# Patient Record
Sex: MALE | Race: WHITE
[De-identification: names, ages, dates, MRNs, and addresses within clinical notes are randomized per-mention and may not be internally consistent; named-entity substitution may affect disease eponyms.]

---

## 2023-03-28 ENCOUNTER — NON-APPOINTMENT (OUTPATIENT)
Age: 48
End: 2023-03-28

## 2023-05-24 PROBLEM — Z00.00 ENCOUNTER FOR PREVENTIVE HEALTH EXAMINATION: Status: ACTIVE | Noted: 2023-05-24

## 2023-05-25 ENCOUNTER — NON-APPOINTMENT (OUTPATIENT)
Age: 48
End: 2023-05-25

## 2023-05-25 ENCOUNTER — APPOINTMENT (OUTPATIENT)
Dept: OTOLARYNGOLOGY | Facility: CLINIC | Age: 48
End: 2023-05-25
Payer: COMMERCIAL

## 2023-05-25 VITALS
BODY MASS INDEX: 33.49 KG/M2 | DIASTOLIC BLOOD PRESSURE: 82 MMHG | HEART RATE: 71 BPM | SYSTOLIC BLOOD PRESSURE: 125 MMHG | WEIGHT: 275 LBS | HEIGHT: 76 IN

## 2023-05-25 DIAGNOSIS — H61.21 IMPACTED CERUMEN, RIGHT EAR: ICD-10-CM

## 2023-05-25 DIAGNOSIS — Z87.19 PERSONAL HISTORY OF OTHER DISEASES OF THE DIGESTIVE SYSTEM: ICD-10-CM

## 2023-05-25 DIAGNOSIS — Z80.8 FAMILY HISTORY OF MALIGNANT NEOPLASM OF OTHER ORGANS OR SYSTEMS: ICD-10-CM

## 2023-05-25 DIAGNOSIS — H69.83 OTHER SPECIFIED DISORDERS OF EUSTACHIAN TUBE, BILATERAL: ICD-10-CM

## 2023-05-25 PROCEDURE — 99203 OFFICE O/P NEW LOW 30 MIN: CPT | Mod: 25

## 2023-05-25 PROCEDURE — 69210 REMOVE IMPACTED EAR WAX UNI: CPT

## 2023-05-25 NOTE — PHYSICAL EXAM
[Midline] : trachea located in midline position [Normal] : no rashes [de-identified] : Right cerumen impaction. Left EAC wnl. [] : septum deviated to the left [Removed] : palatine tonsils previously removed

## 2023-05-25 NOTE — ASSESSMENT
[FreeTextEntry1] : Brown Roche presents for evaluation. HE notes snoring and possible sleep apnea. Will obtain home sleep study. Right cerumen impaction was removed today. he has history of bilateral eustachian tube dysfunction with one ear infection per year. He notes chronic rhinitis symptoms and has septal deviation on exam.\par \par - Obtain home sleep study.\par - Start flonase 2 sprays to each nostril BID.\par - Follow up after sleep study.

## 2023-05-25 NOTE — HISTORY OF PRESENT ILLNESS
[de-identified] : Brown Roche is a 48 yo male who presents for evaluation of snoring. He was seen by an optometrist and told he may have sleep apnea. His wife notes that he snores frequently. He is unsure of pauses in breathing at night. He notes some daytime sleepiness but drinks two large coffees a day. He denies fevers or chills. He denies recurrent throat infections. He notes chronic issues with nasal congestion. He denies recurrent sinus infections. She denies rhinorrhea, postnasal drainage, or sinus pressure. He notes remote history of allergy testing, positive for pollen. He does not use nasal sprays. He gets one ear infection per year and was told he has eustachian tube dysfunction. He denies otalgia, otorrhea, or hearing loss. HE denies tinnitus or vertigo.

## 2023-07-12 ENCOUNTER — OUTPATIENT (OUTPATIENT)
Dept: OUTPATIENT SERVICES | Facility: HOSPITAL | Age: 48
LOS: 1 days | End: 2023-07-12
Payer: COMMERCIAL

## 2023-07-12 DIAGNOSIS — G47.33 OBSTRUCTIVE SLEEP APNEA (ADULT) (PEDIATRIC): ICD-10-CM

## 2023-07-12 PROCEDURE — 95800 SLP STDY UNATTENDED: CPT

## 2023-07-12 PROCEDURE — 95800 SLP STDY UNATTENDED: CPT | Mod: 26

## 2023-07-26 ENCOUNTER — APPOINTMENT (OUTPATIENT)
Dept: OTOLARYNGOLOGY | Facility: CLINIC | Age: 48
End: 2023-07-26
Payer: COMMERCIAL

## 2023-07-26 VITALS — HEART RATE: 88 BPM

## 2023-07-26 VITALS
BODY MASS INDEX: 32.88 KG/M2 | HEIGHT: 76 IN | SYSTOLIC BLOOD PRESSURE: 123 MMHG | WEIGHT: 270 LBS | DIASTOLIC BLOOD PRESSURE: 85 MMHG

## 2023-07-26 DIAGNOSIS — J34.2 DEVIATED NASAL SEPTUM: ICD-10-CM

## 2023-07-26 DIAGNOSIS — G47.33 OBSTRUCTIVE SLEEP APNEA (ADULT) (PEDIATRIC): ICD-10-CM

## 2023-07-26 DIAGNOSIS — J31.0 CHRONIC RHINITIS: ICD-10-CM

## 2023-07-26 DIAGNOSIS — R06.83 SNORING: ICD-10-CM

## 2023-07-26 PROCEDURE — 99213 OFFICE O/P EST LOW 20 MIN: CPT

## 2023-07-26 NOTE — HISTORY OF PRESENT ILLNESS
[de-identified] : Brown Roche is a 48 yo male who presents for evaluation of snoring. He was seen by an optometrist and told he may have sleep apnea. His wife notes that he snores frequently. He is unsure of pauses in breathing at night. He notes some daytime sleepiness but drinks two large coffees a day. He denies fevers or chills. He denies recurrent throat infections. He notes chronic issues with nasal congestion. He denies recurrent sinus infections. She denies rhinorrhea, postnasal drainage, or sinus pressure. He notes remote history of allergy testing, positive for pollen. He does not use nasal sprays. He gets one ear infection per year and was told he has eustachian tube dysfunction. He denies otalgia, otorrhea, or hearing loss. HE denies tinnitus or vertigo. [FreeTextEntry1] : 7/26/23 - Mr. Roche presents for follow-up. He used flonase. He notes improvement in nasal congestion, rhinorrhea, and postnasal drainage. He notes chronic mouth-breathing. He denies sinus pressure. He notes improvement in snoring and denies pauses in breathing. He notes some daytime sleepiness. No fevers or chills.

## 2023-07-26 NOTE — ASSESSMENT
[FreeTextEntry1] : Brown Roche presents for evaluation. He had sleep study showing AHI of 5.1 consistent with very mild obstructive sleep apnea. Discussed weight loss and sleep hygiene measures. Flexible laryngoscopy was normal. HE has history of chronic rhinitis and mouth breathing. Sinonasal endoscopy was performed showing significant septal deviation to left and bilateral inferior turbinate hypertrophy. He has been using flonase. Discussed obtaining CT Sinus and possible septoplasty, but he will consider this for now and let us know. \par \par - Continue flonase prn.\par - Follow up prn.

## 2023-07-26 NOTE — PHYSICAL EXAM
[Nasal Endoscopy Performed] : nasal endoscopy was performed, see procedure section for findings [] : septum deviated to the left [Midline] : trachea located in midline position [Removed] : palatine tonsils previously removed [Laryngoscopy Performed] : laryngoscopy was performed, see procedure section for findings [Normal] : no rashes [de-identified] : Bilateral inferior turbinate hypertrophy.

## 2023-11-02 ENCOUNTER — APPOINTMENT (OUTPATIENT)
Dept: ORTHOPEDIC SURGERY | Facility: CLINIC | Age: 48
End: 2023-11-02
Payer: COMMERCIAL

## 2023-11-02 VITALS — HEIGHT: 76 IN | BODY MASS INDEX: 32.88 KG/M2 | WEIGHT: 270 LBS

## 2023-11-02 DIAGNOSIS — Z78.9 OTHER SPECIFIED HEALTH STATUS: ICD-10-CM

## 2023-11-02 PROCEDURE — 99213 OFFICE O/P EST LOW 20 MIN: CPT | Mod: 25

## 2023-11-02 PROCEDURE — 26750 TREAT FINGER FRACTURE EACH: CPT | Mod: RT

## 2023-11-09 ENCOUNTER — APPOINTMENT (OUTPATIENT)
Dept: ORTHOPEDIC SURGERY | Facility: CLINIC | Age: 48
End: 2023-11-09
Payer: COMMERCIAL

## 2023-11-09 VITALS — BODY MASS INDEX: 32.88 KG/M2 | WEIGHT: 270 LBS | HEIGHT: 76 IN

## 2023-11-09 PROCEDURE — 99024 POSTOP FOLLOW-UP VISIT: CPT

## 2024-03-05 ENCOUNTER — APPOINTMENT (OUTPATIENT)
Dept: ORTHOPEDIC SURGERY | Facility: CLINIC | Age: 49
End: 2024-03-05
Payer: COMMERCIAL

## 2024-03-05 VITALS — BODY MASS INDEX: 32.88 KG/M2 | WEIGHT: 270 LBS | HEIGHT: 76 IN

## 2024-03-05 DIAGNOSIS — S67.10XA CRUSHING INJURY OF UNSPECIFIED FINGER(S), INITIAL ENCOUNTER: ICD-10-CM

## 2024-03-05 DIAGNOSIS — L98.0 PYOGENIC GRANULOMA: ICD-10-CM

## 2024-03-05 DIAGNOSIS — S62.636A DISPLACED FRACTURE OF DISTAL PHALANX OF RIGHT LITTLE FINGER, INITIAL ENCOUNTER FOR CLOSED FRACTURE: ICD-10-CM

## 2024-03-05 PROCEDURE — 73140 X-RAY EXAM OF FINGER(S): CPT | Mod: RT

## 2024-03-05 PROCEDURE — 99213 OFFICE O/P EST LOW 20 MIN: CPT

## 2024-03-05 RX ORDER — SILVER SULFADIAZINE 10 MG/G
1 CREAM TOPICAL
Qty: 50 | Refills: 0 | Status: ACTIVE | COMMUNITY
Start: 2024-03-05 | End: 1900-01-01

## 2024-03-05 NOTE — PHYSICAL EXAM
[de-identified] : R SF Pyogenic granuloma at the distal nail bed Tender Min stiffness  Xrays healed fracture

## 2024-03-05 NOTE — HISTORY OF PRESENT ILLNESS
[de-identified] : R MARZENA crush injury Distal phalanx fracture from Nov  Now nail fell off [5] : 5 [2] : 2 [Dull/Aching] : dull/aching [FreeTextEntry1] : LISETH IVAN

## 2024-03-13 RX ORDER — SILVER NITRATE APPLICATORS 25; 75 MG/100MG; MG/100MG
75-25 STICK TOPICAL
Qty: 1 | Refills: 0 | Status: ACTIVE | COMMUNITY
Start: 2024-03-13 | End: 1900-01-01

## 2024-03-14 ENCOUNTER — APPOINTMENT (OUTPATIENT)
Dept: ORTHOPEDIC SURGERY | Facility: CLINIC | Age: 49
End: 2024-03-14

## 2024-06-23 ENCOUNTER — NON-APPOINTMENT (OUTPATIENT)
Age: 49
End: 2024-06-23